# Patient Record
Sex: MALE | Race: BLACK OR AFRICAN AMERICAN | NOT HISPANIC OR LATINO | URBAN - METROPOLITAN AREA
[De-identification: names, ages, dates, MRNs, and addresses within clinical notes are randomized per-mention and may not be internally consistent; named-entity substitution may affect disease eponyms.]

---

## 2018-05-24 ENCOUNTER — EMERGENCY (EMERGENCY)
Facility: HOSPITAL | Age: 43
LOS: 1 days | Discharge: HOME | End: 2018-05-24
Attending: EMERGENCY MEDICINE
Payer: COMMERCIAL

## 2018-05-24 VITALS
SYSTOLIC BLOOD PRESSURE: 141 MMHG | RESPIRATION RATE: 16 BRPM | DIASTOLIC BLOOD PRESSURE: 92 MMHG | WEIGHT: 248.9 LBS | HEART RATE: 84 BPM | HEIGHT: 74 IN | OXYGEN SATURATION: 98 % | TEMPERATURE: 98 F

## 2018-05-24 LAB
ALBUMIN SERPL ELPH-MCNC: 4.3 G/DL — SIGNIFICANT CHANGE UP (ref 3.3–5)
ALP SERPL-CCNC: 89 U/L — SIGNIFICANT CHANGE UP (ref 40–120)
ALT FLD-CCNC: 19 U/L — SIGNIFICANT CHANGE UP (ref 10–45)
ANION GAP SERPL CALC-SCNC: 14 MMOL/L — SIGNIFICANT CHANGE UP (ref 5–17)
APPEARANCE UR: CLEAR — SIGNIFICANT CHANGE UP
AST SERPL-CCNC: 14 U/L — SIGNIFICANT CHANGE UP (ref 10–40)
BASOPHILS # BLD AUTO: 0 K/UL — SIGNIFICANT CHANGE UP (ref 0–0.2)
BASOPHILS NFR BLD AUTO: 0.1 % — SIGNIFICANT CHANGE UP (ref 0–2)
BILIRUB SERPL-MCNC: 0.5 MG/DL — SIGNIFICANT CHANGE UP (ref 0.2–1.2)
BILIRUB UR-MCNC: NEGATIVE — SIGNIFICANT CHANGE UP
BUN SERPL-MCNC: 12 MG/DL — SIGNIFICANT CHANGE UP (ref 7–23)
CALCIUM SERPL-MCNC: 9.7 MG/DL — SIGNIFICANT CHANGE UP (ref 8.4–10.5)
CHLORIDE SERPL-SCNC: 103 MMOL/L — SIGNIFICANT CHANGE UP (ref 96–108)
CO2 SERPL-SCNC: 25 MMOL/L — SIGNIFICANT CHANGE UP (ref 22–31)
COLOR SPEC: YELLOW — SIGNIFICANT CHANGE UP
COMMENT - URINE: SIGNIFICANT CHANGE UP
CREAT SERPL-MCNC: 1.63 MG/DL — HIGH (ref 0.5–1.3)
DIFF PNL FLD: ABNORMAL
EOSINOPHIL # BLD AUTO: 0 K/UL — SIGNIFICANT CHANGE UP (ref 0–0.5)
EOSINOPHIL NFR BLD AUTO: 0.5 % — SIGNIFICANT CHANGE UP (ref 0–6)
EPI CELLS # UR: SIGNIFICANT CHANGE UP /HPF
GLUCOSE SERPL-MCNC: 86 MG/DL — SIGNIFICANT CHANGE UP (ref 70–99)
GLUCOSE UR QL: NEGATIVE — SIGNIFICANT CHANGE UP
HCT VFR BLD CALC: 42.3 % — SIGNIFICANT CHANGE UP (ref 39–50)
HGB BLD-MCNC: 14.3 G/DL — SIGNIFICANT CHANGE UP (ref 13–17)
KETONES UR-MCNC: ABNORMAL
LEUKOCYTE ESTERASE UR-ACNC: NEGATIVE — SIGNIFICANT CHANGE UP
LYMPHOCYTES # BLD AUTO: 2.1 K/UL — SIGNIFICANT CHANGE UP (ref 1–3.3)
LYMPHOCYTES # BLD AUTO: 22.2 % — SIGNIFICANT CHANGE UP (ref 13–44)
MCHC RBC-ENTMCNC: 28.4 PG — SIGNIFICANT CHANGE UP (ref 27–34)
MCHC RBC-ENTMCNC: 33.8 GM/DL — SIGNIFICANT CHANGE UP (ref 32–36)
MCV RBC AUTO: 84 FL — SIGNIFICANT CHANGE UP (ref 80–100)
MONOCYTES # BLD AUTO: 0.6 K/UL — SIGNIFICANT CHANGE UP (ref 0–0.9)
MONOCYTES NFR BLD AUTO: 6.3 % — SIGNIFICANT CHANGE UP (ref 2–14)
NEUTROPHILS # BLD AUTO: 6.8 K/UL — SIGNIFICANT CHANGE UP (ref 1.8–7.4)
NEUTROPHILS NFR BLD AUTO: 70.9 % — SIGNIFICANT CHANGE UP (ref 43–77)
NITRITE UR-MCNC: NEGATIVE — SIGNIFICANT CHANGE UP
PH UR: 6.5 — SIGNIFICANT CHANGE UP (ref 5–8)
PLATELET # BLD AUTO: 274 K/UL — SIGNIFICANT CHANGE UP (ref 150–400)
POTASSIUM SERPL-MCNC: 4.1 MMOL/L — SIGNIFICANT CHANGE UP (ref 3.5–5.3)
POTASSIUM SERPL-SCNC: 4.1 MMOL/L — SIGNIFICANT CHANGE UP (ref 3.5–5.3)
PROT SERPL-MCNC: 7.5 G/DL — SIGNIFICANT CHANGE UP (ref 6–8.3)
PROT UR-MCNC: SIGNIFICANT CHANGE UP
RBC # BLD: 5.03 M/UL — SIGNIFICANT CHANGE UP (ref 4.2–5.8)
RBC # FLD: 11.4 % — SIGNIFICANT CHANGE UP (ref 10.3–14.5)
RBC CASTS # UR COMP ASSIST: ABNORMAL /HPF (ref 0–2)
SODIUM SERPL-SCNC: 142 MMOL/L — SIGNIFICANT CHANGE UP (ref 135–145)
SP GR SPEC: 1.02 — SIGNIFICANT CHANGE UP (ref 1.01–1.02)
UROBILINOGEN FLD QL: NEGATIVE — SIGNIFICANT CHANGE UP
WBC # BLD: 9.6 K/UL — SIGNIFICANT CHANGE UP (ref 3.8–10.5)
WBC # FLD AUTO: 9.6 K/UL — SIGNIFICANT CHANGE UP (ref 3.8–10.5)
WBC UR QL: SIGNIFICANT CHANGE UP /HPF (ref 0–5)

## 2018-05-24 PROCEDURE — 87086 URINE CULTURE/COLONY COUNT: CPT

## 2018-05-24 PROCEDURE — 99284 EMERGENCY DEPT VISIT MOD MDM: CPT

## 2018-05-24 PROCEDURE — 85027 COMPLETE CBC AUTOMATED: CPT

## 2018-05-24 PROCEDURE — 99284 EMERGENCY DEPT VISIT MOD MDM: CPT | Mod: 25

## 2018-05-24 PROCEDURE — 96375 TX/PRO/DX INJ NEW DRUG ADDON: CPT

## 2018-05-24 PROCEDURE — 81001 URINALYSIS AUTO W/SCOPE: CPT

## 2018-05-24 PROCEDURE — 80053 COMPREHEN METABOLIC PANEL: CPT

## 2018-05-24 PROCEDURE — 96374 THER/PROPH/DIAG INJ IV PUSH: CPT

## 2018-05-24 PROCEDURE — 74176 CT ABD & PELVIS W/O CONTRAST: CPT

## 2018-05-24 PROCEDURE — 74176 CT ABD & PELVIS W/O CONTRAST: CPT | Mod: 26

## 2018-05-24 RX ORDER — ACETAMINOPHEN 500 MG
1000 TABLET ORAL ONCE
Qty: 0 | Refills: 0 | Status: COMPLETED | OUTPATIENT
Start: 2018-05-24 | End: 2018-05-24

## 2018-05-24 RX ORDER — ONDANSETRON 8 MG/1
4 TABLET, FILM COATED ORAL ONCE
Qty: 0 | Refills: 0 | Status: COMPLETED | OUTPATIENT
Start: 2018-05-24 | End: 2018-05-24

## 2018-05-24 RX ORDER — SODIUM CHLORIDE 9 MG/ML
1000 INJECTION INTRAMUSCULAR; INTRAVENOUS; SUBCUTANEOUS ONCE
Qty: 0 | Refills: 0 | Status: COMPLETED | OUTPATIENT
Start: 2018-05-24 | End: 2018-05-24

## 2018-05-24 RX ADMIN — Medication 1000 MILLIGRAM(S): at 22:49

## 2018-05-24 RX ADMIN — Medication 400 MILLIGRAM(S): at 22:06

## 2018-05-24 RX ADMIN — SODIUM CHLORIDE 1000 MILLILITER(S): 9 INJECTION INTRAMUSCULAR; INTRAVENOUS; SUBCUTANEOUS at 22:06

## 2018-05-24 RX ADMIN — ONDANSETRON 4 MILLIGRAM(S): 8 TABLET, FILM COATED ORAL at 22:07

## 2018-05-24 NOTE — ED ADULT NURSE NOTE - OBJECTIVE STATEMENT
41 yo M presents c/o L sided flank pain radiating to groin x2 weeks. States he was seen at another hospital in [place] 2 weeks ago where he was dx w/ kidney stones. Unsure how many stones or sizes. Reports that he has been having pain and nausea since that time, today developed hematuria and vomiting with increase in pain. Last vomited 1700 today. Denies CP, SOB, fevers, chills. States no difficulty urinating, no burning w/ urination. Abdomen soft, not tender, not distended. VSS.

## 2018-05-24 NOTE — ED PROVIDER NOTE - PROGRESS NOTE DETAILS
Sign out follow-up: CT reports left ureterolithiasis with hydronephrosis. Urology consulted as Cr elevated (no baseline comparison). Sx controlled. Pt well-appearing and afebrile. No UTI on UA. GRUPO Pt feeling well, urology saw and ok for discahrge.  - Marissa Hurley, DO

## 2018-05-24 NOTE — ED PROVIDER NOTE - OBJECTIVE STATEMENT
42M pmh kidney stones p/w 2 weeks of hematuria, nausea/vomiting, left flank and abdominal pain.  pain has been constant and worsening.  No dysuria.  Pt was seen at outside ER and discharged, then again by PMD for blood work but does not have results.  Pt says this feels a little different form prior stone.  No chest pain, shortness of breath, diarrhea, fever/chills, testicular pain.    - Marissa Hurley, DO

## 2018-05-24 NOTE — ED PROVIDER NOTE - PHYSICAL EXAMINATION
Gen: NAD, AOx3  Head: NCAT  HEENT: PERRL, oral mucosa moist, normal conjunctiva  Lung: CTAB, no respiratory distress  CV: rrr, no murmurs, Normal perfusion  Abd: soft, nondistended, TTP LUQ/LLQ/left flank with guarding, mild left CVA tenderness  MSK: No edema, no visible deformities  Neuro: No focal neurologic deficits  Skin: No rash   - Marissa Hurley, DO

## 2018-05-24 NOTE — ED PROVIDER NOTE - PLAN OF CARE
- A prescription has been sent to your pharmacy - please take as directed  - Take Tylenol 650mg every 6 hrs as needed for pain.   - Drink 2-3 liters of water a day  - Return to the ER with any persistent vomiting, severely worsening pain, fevers.   - Please follow up with Dr. Gonzalez (Urology) in the office.  Call for an appointment - contact info below  - Please use strainer when you urinate to try and catch the stone.

## 2018-05-24 NOTE — ED PROVIDER NOTE - CARE PLAN
Principal Discharge DX:	Flank pain  Secondary Diagnosis:	Abdominal pain Principal Discharge DX:	Flank pain  Assessment and plan of treatment:	- A prescription has been sent to your pharmacy - please take as directed  - Take Tylenol 650mg every 6 hrs as needed for pain.   - Drink 2-3 liters of water a day  - Return to the ER with any persistent vomiting, severely worsening pain, fevers.   - Please follow up with Dr. Gonzalez (Urology) in the office.  Call for an appointment - contact info below  - Please use strainer when you urinate to try and catch the stone.  Secondary Diagnosis:	Abdominal pain

## 2018-05-24 NOTE — ED PROVIDER NOTE - MEDICAL DECISION MAKING DETAILS
ATTG: : abd pain concern for stone / colitis, check ct a/p, check urine check labs. pain medication, ivf, re eval for dispo

## 2018-05-24 NOTE — ED PROVIDER NOTE - ATTENDING CONTRIBUTION TO CARE
43 y/o m with pmhx kidney stones presents for abd pain that began approx 2 weeks ago. no associated rectal bleeding. pain described as left sided, radiates from left shoulder where is started down to abd. no tearing pain. no associated chest pain or heart palp. worse since yesterday. no urinary complaints. no relief with pain meds at home. drinking more fluid. Pt was seen at outside ER and discharged, then again by PMD for blood work but does not have results. no trauma. + hematuria. father at the bedside. does not feel the same as usual kidney stones.   Gen.  no acute distress  HEENT:  PERRL EOMI  Lungs:  ctab/l  CVS: S1S2   Abd;  soft no guarding. no distention. + tender to palp left side. no cva tenderness.   Ext: no edema  Neuro: aaox3 no focal deficits  MSK: 5/5 x 4 ext

## 2018-05-25 VITALS
SYSTOLIC BLOOD PRESSURE: 151 MMHG | DIASTOLIC BLOOD PRESSURE: 84 MMHG | HEART RATE: 68 BPM | RESPIRATION RATE: 16 BRPM | TEMPERATURE: 99 F | OXYGEN SATURATION: 100 %

## 2018-05-25 LAB
CULTURE RESULTS: SIGNIFICANT CHANGE UP
SPECIMEN SOURCE: SIGNIFICANT CHANGE UP

## 2018-05-25 RX ORDER — TAMSULOSIN HYDROCHLORIDE 0.4 MG/1
1 CAPSULE ORAL
Qty: 7 | Refills: 0 | OUTPATIENT
Start: 2018-05-25 | End: 2018-05-31

## 2018-05-25 NOTE — CONSULT NOTE ADULT - SUBJECTIVE AND OBJECTIVE BOX
Urology PA Consult Note    HPI:  This is a 42y old Male with PMHx of nephrolithiasis, who presents with left renal colic for the past 2 weeks.  Admits to nausea/vomiting, hematuria; denies fevers/chills, CP, SOB, changes in urinary habits.  Pt states the persistent nausea and hematuria brought him into the ER.  Currently pain controlled after taking only tylenol.  He has passed stones before, and has never required any surgical intervention for them.    PAST MEDICAL & SURGICAL HISTORY:  Nephrolithiasis      FAMILY HISTORY:  noncontributory    SOCIAL HISTORY:   noncontributory      Allergies    No Known Allergies          REVIEW OF SYSTEMS: Pertinent positives and negatives as stated in HPI, otherwise negative    Vital signs  T(C): 37.1 (18 @ 02:21), Max: 37.1 (18 @ 21:23)  HR: 68 (18 @ 02:21)  BP: 151/84 (18 @ 02:21)  SpO2: 100% (18 @ 02:21)  Wt(kg): --    I&O's Summary      Physical Exam  Gen: NAD, A+Ox3  Abd: Soft, +LLQ tenderness, ND  Back: +L CVAT  Ext: No edema present b/l    LABS:                            14.3   9.6   )-----------( 274      ( 24 May 2018 22:24 )             42.3         142  |  103  |  12  ----------------------------<  86  4.1   |  25  |  1.63<H>    Ca    9.7      24 May 2018 22:24    TPro  7.5  /  Alb  4.3  /  TBili  0.5  /  DBili  x   /  AST  14  /  ALT  19  /  AlkPhos  89        Urinalysis Basic - ( 24 May 2018 23:15 )    Color: Yellow / Appearance: Clear / S.023 / pH: x  Gluc: x / Ketone: Trace  / Bili: Negative / Urobili: Negative   Blood: x / Protein: Trace / Nitrite: Negative   Leuk Esterase: Negative / RBC: 25-50 /HPF / WBC 3-5 /HPF   Sq Epi: x / Non Sq Epi: OCC /HPF / Bacteria: x        Urine culture: drawn, pending results      Imaging:    CT: 4mm left distal calculus with mild hydronephrosis

## 2018-05-25 NOTE — CONSULT NOTE ADULT - ASSESSMENT
42 year old male with left renal colic    -PO hydration 2-3L/day  -analgesia as needed  -flomax 0.4mg qd  -strain all urine  -return to ER if intractable pain/nausea, or fevers develop  -case d/w Carlos

## 2018-05-25 NOTE — ED ADULT NURSE REASSESSMENT NOTE - NS ED NURSE REASSESS COMMENT FT1
pt resting comfortably, awaiting urology consult. states pain still 2/10, tolerable, "much better than before". VSS. family at bedside.
pt resting comfortably, reports improvement in pain to 2/10, states nausea resolved.   awaiting CT results.

## 2022-08-12 NOTE — ED PROVIDER NOTE - TEMPLATE, MLM
Please obtain liver ultrasound in 6 months before your follow up appointment with hepatology Abdominal Pain, N/V/D